# Patient Record
Sex: MALE | Race: ASIAN | NOT HISPANIC OR LATINO | URBAN - METROPOLITAN AREA
[De-identification: names, ages, dates, MRNs, and addresses within clinical notes are randomized per-mention and may not be internally consistent; named-entity substitution may affect disease eponyms.]

---

## 2021-12-17 ENCOUNTER — EMERGENCY (EMERGENCY)
Facility: HOSPITAL | Age: 26
LOS: 1 days | Discharge: HOME | End: 2021-12-17
Attending: EMERGENCY MEDICINE | Admitting: EMERGENCY MEDICINE
Payer: MEDICAID

## 2021-12-17 VITALS
OXYGEN SATURATION: 100 % | TEMPERATURE: 98 F | HEART RATE: 102 BPM | RESPIRATION RATE: 18 BRPM | DIASTOLIC BLOOD PRESSURE: 76 MMHG | SYSTOLIC BLOOD PRESSURE: 126 MMHG

## 2021-12-17 PROCEDURE — 99284 EMERGENCY DEPT VISIT MOD MDM: CPT

## 2021-12-17 RX ORDER — FAMOTIDINE 10 MG/ML
20 INJECTION INTRAVENOUS ONCE
Refills: 0 | Status: COMPLETED | OUTPATIENT
Start: 2021-12-17 | End: 2021-12-17

## 2021-12-17 RX ORDER — FAMOTIDINE 10 MG/ML
1 INJECTION INTRAVENOUS
Qty: 21 | Refills: 0
Start: 2021-12-17 | End: 2022-01-06

## 2021-12-17 NOTE — ED PROVIDER NOTE - PROGRESS NOTE DETAILS
Humble Hathaway, DO PGY-2: NIH stroke scale 0, code stroke called off, pt's symptoms resolved, no weakness, no nausea/vomiting, pt states he feels well, no family history of MI or cardiac disease, pt seeing PCP this week, will get ekg, pepcid Humble Hathaway, DO PGY-2: Shared decision making w/ family, pt, will DC home w/ meds, return precautions
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
## 2021-12-17 NOTE — ED PROVIDER NOTE - NSFOLLOWUPINSTRUCTIONS_ED_ALL_ED_FT
You were seen in the Emergency Department for arm tingling.    Follow up with your primary care provider within 3-5 days.     If you have a return of symptoms, if you develop fever, chills, nausea, vomiting, new or worsening pain, or if you have any new symptoms return to the Emergency Department. You were seen in the Emergency Department for arm tingling.    You are being sent a prescription for maalox. Please see medication labels for instructions and warnings.     You are being sent a prescription for famotidine. Please see medication labels for instructions and warnings.     Eat small meals towards the end of the day, try not to eat immediately prior to sleep, try not to lie completely flat soon after eating.    Follow up with your primary care provider within 3-5 days.     If you have a return of symptoms, if you develop fever, chills, nausea, vomiting, new or worsening pain, or if you have any new symptoms return to the Emergency Department.
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
## 2021-12-17 NOTE — ED ADULT NURSE NOTE - BREATH SOUNDS, MLM
Detail Level: Detailed Render Post-Care Instructions In Note?: yes Duration Of Freeze Thaw-Cycle (Seconds): 5 Post-Care Instructions: I reviewed with the patient in detail post-care instructions. Patient is to wear sunprotection, and avoid picking at any of the treated lesions. Pt may apply Vaseline to crusted or scabbing areas. Consent: The patient's consent was obtained including but not limited to risks of crusting, scabbing, blistering, scarring, darker or lighter pigmentary change, recurrence, incomplete removal and infection. Clear

## 2021-12-17 NOTE — ED PROVIDER NOTE - NS ED ROS FT
Constitutional:  (-) fever, (-) chills, (-) lethargy  Eyes:  (-) eye pain (-) visual changes  ENMT: (-) nasal discharge, (-) sore throat. (-) neck pain or stiffness  Cardiac: (-) chest pain (-) palpitations  Respiratory:  (-) cough (-) respiratory distress.   GI:  (-) nausea (-) vomiting (-) diarrhea (-) abdominal pain.  :  (-) dysuria (-) frequency (-) burning.  MS:  (-) back pain (-) joint pain.  Neuro:  (-) headache (-) numbness (+) tingling (-) focal weakness  Skin:  (-) rash  Except as documented in the HPI,  all other systems are negative

## 2021-12-17 NOTE — ED PROVIDER NOTE - NS ED ATTENDING STATEMENT MOD
----- Message from Gabriela Weiner sent at 10/23/2018 10:29 AM CDT -----  Contact: [phone]  Pt would like a call back to go over his medications with a nurse, he believes he may be missing one. Please avdise   I have personally seen and examined this patient.  I have fully participated in the care of this patient. I have reviewed all pertinent clinical information, including history, physical exam, plan and the Resident’s note and agree except as noted.

## 2021-12-17 NOTE — ED PROVIDER NOTE - PATIENT PORTAL LINK FT
You can access the FollowMyHealth Patient Portal offered by [facility] by registering at the following website: [email or web address]. By joining [employer]’s FollowMyHealth portal, you will also be able to view your health information using other applications (apps) compatible with our system.

## 2021-12-17 NOTE — ED PROVIDER NOTE - PHYSICAL EXAMINATION
CONSTITUTIONAL: NAD  HEAD: NCAT  ENT: normal pharynx with no erythema or exudates  NECK: Supple; non tender. Full ROM.  CARD: RRR, no murmurs.  RESP: clear to ausculation b/l. No crackles or wheezing.  ABD: soft, non-tender, non-distended, no rebound or guarding.  MSK: Full ROM, no bony tenderness, no pedal edema, no calf tenderness  NEURO: normal motor upper extremities equal b/l, no motor deficits, lower extremity str equal bilaterally. normal sensory but pt endorses some LUE tingling over the arm which has almost entirely resolved. No facial asymmetry. CN II-XII intact. Cerebellar testing normal. Normal gait.  PSYCH: Cooperative, appropriate.
